# Patient Record
Sex: FEMALE | Race: ASIAN | Employment: UNEMPLOYED | URBAN - METROPOLITAN AREA
[De-identification: names, ages, dates, MRNs, and addresses within clinical notes are randomized per-mention and may not be internally consistent; named-entity substitution may affect disease eponyms.]

---

## 2018-06-11 ENCOUNTER — THERAPY VISIT (OUTPATIENT)
Dept: PHYSICAL THERAPY | Facility: CLINIC | Age: 43
End: 2018-06-11
Payer: COMMERCIAL

## 2018-06-11 DIAGNOSIS — M26.609 TMJ (TEMPOROMANDIBULAR JOINT SYNDROME): Primary | ICD-10-CM

## 2018-06-11 PROCEDURE — 97530 THERAPEUTIC ACTIVITIES: CPT | Mod: GP | Performed by: PHYSICAL THERAPIST

## 2018-06-11 PROCEDURE — 97140 MANUAL THERAPY 1/> REGIONS: CPT | Mod: GP | Performed by: PHYSICAL THERAPIST

## 2018-06-11 PROCEDURE — 97161 PT EVAL LOW COMPLEX 20 MIN: CPT | Mod: GP | Performed by: PHYSICAL THERAPIST

## 2018-06-11 PROCEDURE — 97110 THERAPEUTIC EXERCISES: CPT | Mod: GP | Performed by: PHYSICAL THERAPIST

## 2018-06-11 NOTE — PROGRESS NOTES
[facility] Initial Evaluation  Subjective:  Patient is a 42 year old female presenting with rehab tmj hpi.   Gina Breaux is a 42 year old female with a TMJ bilateral condition.  Condition occurred with:  Other reason.  Condition occurred: at home.  This is a chronic condition  Patient reports a long history of jaw pain >2 years that began around the time that she was diagnosed with lung cancer. Patient reports worsening of jaw pain in 2018 and report 2 episodes of open lock while yawning at night. Patient does reports experiencing an open lock at least one other time at a dentist appointment..    Patient reports pain:  Cervical left, cervical right, face left, face right, TMJ left and TMJ right.    Pain is described as sharp and aching and is constant and reported as 7/10.  Associated symptoms:  Cheek pain, headache cervical, locking: open or closed, ear ache and tinnitus. Pain is worse during the day.  Symptoms are exacerbated by bushing/flossing, dental appointments, chewing, yawning and opening and relieved by rest.  Since onset symptoms are gradually worsening.  Special testing: none.      General health as reported by patient is good.  Pertinent medical history includes:  Cancer (lung cancer).                    Red flags:  None as reported by the patient.                        Objective:  Standing Alignment:    Cervical/Thoracic:  Forward head  Shoulder/UE:  Rounded shoulders                                                          TMJ Evaluation  ROM:   AROM Cervical: normal          AROM TMJ:  Openin mm     Left Laterotrusion:  6mm    Right Laterotrusion:  7 mm    Protrusion: 3mm    Retrusion:  1mm      Parafunctional Habits:    Bruxism:  None    Chewing/Biting Nails:  None    Clenching:  Occasional    Caffeine:  None    Aerobic Exercise:  Seldom  Sleep Quality:  Fair      Palpation:    Left side tenderness present at:  Upper Trap; Levator; Superficial Masseter; Deep  Masseter; Temporalis; Medial Pterygoid and Lateral Pterygoid  Right side tenderness present at:  Upper Trap; Levator; Superficial Masseter; Deep Masseter; Temporalis; Medial Pterygoid and Lateral Pterygoid    Movement Characteristics:    Click:  On right with end range opening  Crepitus:  On right with opening and closing    Subluxation:  With reduction on right  Early Translation:  On left     TMJ Findings:    Tongue Positioning:  Floor  Mucosal Ridging:  None  Tongue Scalloping:  None      Capsule Palpation:  Left side tenderness present at :  Lateral Capsule  Right side tenderness present at:  Lateral Capsule                General     ROS    Assessment/Plan:    Patient is a 42 year old female with both sides TMJ complaints.    Patient has the following significant findings with corresponding treatment plan.                Diagnosis 1:  TMJD  Pain -  hot/cold therapy, manual therapy, self management, education and home program  Decreased ROM/flexibility - manual therapy, therapeutic exercise, therapeutic activity and home program  Decreased strength - therapeutic exercise, therapeutic activities and home program  Inflammation - cold therapy and self management/home program  Impaired muscle performance - neuro re-education and home program  Decreased function - therapeutic activities and home program  Impaired posture - neuro re-education, therapeutic activities and home program    Therapy Evaluation Codes:   1) History comprised of:   Personal factors that impact the plan of care:      Language, Overall behavior pattern, Past/current experiences and Time since onset of symptoms.    Comorbidity factors that impact the plan of care are:      Cancer.     Medications impacting care: None.  2) Examination of Body Systems comprised of:   Body structures and functions that impact the plan of care:      TMJ.   Activity limitations that impact the plan of care are:      chewing, yawning.  3) Clinical presentation  characteristics are:   Stable/Uncomplicated.  4) Decision-Making    Low complexity using standardized patient assessment instrument and/or measureable assessment of functional outcome.  Cumulative Therapy Evaluation is: Low complexity.    Previous and current functional limitations:  (See Goal Flow Sheet for this information)    Short term and Long term goals: (See Goal Flow Sheet for this information)     Communication ability:  Patient appears to be able to clearly communicate and understand verbal and written communication and follow directions correctly.  Treatment Explanation - The following has been discussed with the patient:   RX ordered/plan of care  Anticipated outcomes  Possible risks and side effects  This patient would benefit from PT intervention to resume normal activities.   Rehab potential is good.    Frequency:  1 X week, once daily  Duration:  for 6 weeks  Discharge Plan:  Achieve all LTG.  Independent in home treatment program.  Reach maximal therapeutic benefit.    Please refer to the daily flowsheet for treatment today, total treatment time and time spent performing 1:1 timed codes.

## 2018-06-29 ENCOUNTER — THERAPY VISIT (OUTPATIENT)
Dept: PHYSICAL THERAPY | Facility: CLINIC | Age: 43
End: 2018-06-29
Payer: COMMERCIAL

## 2018-06-29 DIAGNOSIS — M26.609 TMJ (TEMPOROMANDIBULAR JOINT SYNDROME): ICD-10-CM

## 2018-06-29 PROCEDURE — 97110 THERAPEUTIC EXERCISES: CPT | Mod: GP | Performed by: PHYSICAL THERAPIST

## 2018-06-29 PROCEDURE — 97140 MANUAL THERAPY 1/> REGIONS: CPT | Mod: GP | Performed by: PHYSICAL THERAPIST

## 2018-07-17 ENCOUNTER — THERAPY VISIT (OUTPATIENT)
Dept: PHYSICAL THERAPY | Facility: CLINIC | Age: 43
End: 2018-07-17
Payer: COMMERCIAL

## 2018-07-17 DIAGNOSIS — M26.609 TMJ (TEMPOROMANDIBULAR JOINT SYNDROME): ICD-10-CM

## 2018-07-17 PROCEDURE — 97110 THERAPEUTIC EXERCISES: CPT | Mod: GP | Performed by: PHYSICAL THERAPIST

## 2018-07-17 PROCEDURE — 97140 MANUAL THERAPY 1/> REGIONS: CPT | Mod: GP | Performed by: PHYSICAL THERAPIST

## 2018-08-08 ENCOUNTER — THERAPY VISIT (OUTPATIENT)
Dept: PHYSICAL THERAPY | Facility: CLINIC | Age: 43
End: 2018-08-08
Payer: COMMERCIAL

## 2018-08-08 DIAGNOSIS — M26.609 TMJ (TEMPOROMANDIBULAR JOINT SYNDROME): ICD-10-CM

## 2018-08-08 PROCEDURE — 97140 MANUAL THERAPY 1/> REGIONS: CPT | Mod: GP | Performed by: PHYSICAL THERAPIST

## 2018-08-29 ENCOUNTER — THERAPY VISIT (OUTPATIENT)
Dept: PHYSICAL THERAPY | Facility: CLINIC | Age: 43
End: 2018-08-29
Payer: COMMERCIAL

## 2018-08-29 DIAGNOSIS — M26.609 TMJ (TEMPOROMANDIBULAR JOINT SYNDROME): ICD-10-CM

## 2018-08-29 PROCEDURE — 97140 MANUAL THERAPY 1/> REGIONS: CPT | Mod: GP | Performed by: PHYSICAL THERAPIST

## 2018-08-29 NOTE — PROGRESS NOTES
Subjective:  HPI                    Objective:  System    Physical Exam    General     ROS    Assessment/Plan:    DISCHARGE REPORT    Progress reporting period is from 6/11/2018 to 8/29/2018.       SUBJECTIVE  Subjective changes noted by patient:   Subjective: Patient reports not having aHA for 3 weeks. patient reports feeling much better. Patient does report a hearing problem that started recently. patient states when someone talks to her she hears feedback (if someone says hello, she hears Hello....Hello) Patient was told to follow up with her physician regarding this complaint.  Current pain level is  Current Pain level: 4/10 (due to TPN).    Previous pain level was   Initial Pain level: 8/10.   Changes in function:  Yes (See Goal flowsheet attached for changes in current functional level)  Adverse reaction to treatment or activity: None    OBJECTIVE  Changes noted in objective findings:  Yes,   Objective: TMJ AROM WNL throughout.  No tenderness with palpation. subluxation with reduction on right, but mess less prominent and not painful to the patient     ASSESSMENT/PLAN  Updated problem list and treatment plan: Diagnosis 1:  TMJD    STG/LTGs have been met or progress has been made towards goals:  Yes (See Goal flow sheet completed today.)  Assessment of Progress: The patient's condition is improving.  The patient has met all of their long term goals.  Self Management Plans:  Patient is independent in a home treatment program.  Patient is independent in self management of symptoms.  I have re-evaluated this patient and find that the nature, scope, duration and intensity of the therapy is appropriate for the medical condition of the patient.  Gina continues to require the following intervention to meet STG and LTG's:  PT intervention is no longer required to meet STG/LTG.    Recommendations:  This patient is ready to be discharged from therapy and continue their home treatment program.    Please refer to the  daily flowsheet for treatment today, total treatment time and time spent performing 1:1 timed codes.

## 2021-08-27 ENCOUNTER — THERAPY VISIT (OUTPATIENT)
Dept: PHYSICAL THERAPY | Facility: CLINIC | Age: 46
End: 2021-08-27
Payer: MEDICARE

## 2021-08-27 DIAGNOSIS — M26.609 TMJ (TEMPOROMANDIBULAR JOINT SYNDROME): ICD-10-CM

## 2021-08-27 DIAGNOSIS — G44.209 TENSION HEADACHE: ICD-10-CM

## 2021-08-27 PROCEDURE — 97110 THERAPEUTIC EXERCISES: CPT | Mod: GP | Performed by: PHYSICAL THERAPIST

## 2021-08-27 PROCEDURE — 97161 PT EVAL LOW COMPLEX 20 MIN: CPT | Mod: GP | Performed by: PHYSICAL THERAPIST

## 2021-08-27 NOTE — PROGRESS NOTES
Physical Therapy Initial Evaluation  Subjective:  The history is provided by the patient. No  was used.   Therapist Generated HPI Evaluation  Problem details: Pt reports chronic pain in her jaw with headaches. Has had PT in the past for similar symptoms which was helpful. Most recently around five months ago (March 2021) noticed increased in jaw tightness and headaches. Currently undergoing chemotherapy treatments every three weeks for chronic lung cancer (2017 dx). Saw MD who recommended PT. .         Type of problem:  TMJ bilateral (L>R).    This is a recurrent condition.  Condition occurred with:  Other reason.  Where condition occurred: for unknown reasons.  Patient reports pain:  TMJ left and TMJ right.  Pain is described as other (goosepumps and cold) and is constant.  Pain radiates to:  Head. Pain is the same all the time.  Since onset symptoms are unchanged.  Associated symptoms:  Cheek pain, joint noise, headache temporal, difficulty swallowing, stiffness/limited opening and tired or painful jaw muscles. Symptoms are exacerbated by chewing and opening      Previous treatment includes physical therapy.     Parafunctional Habits:    Bruxism:  Maybe a little at night  Mandibular Habits:  No  Chewing/Biting Nails:  No    Clenching:  Yes    Caffeine:  No    Aerobic Exercise:  Walking  Sleep Quality:  N/a  Patient Health History         Pain is reported as 5/10 on pain scale.  General health as reported by patient is fair.  Pertinent medical history includes: cancer, menopausal, migraines/headaches, numbness/tingling, seizures and sleep disorder/apnea (lung cancer, constipation).   Red flags:  Persistent fever/chills and significant weakness.  Medical allergies: latex and adhesives.   Surgeries include:  Cancer surgery (cancer surgery 1/7/17, bunion 2008, endometriosis 2020, obstruct. 2017).    Current medications:  Anti-seizure medication, bone density, pain medication and sleep medication.     Current occupation is none.                                       Objective:  Standing Alignment:    Cervical/Thoracic:  Forward head and cervical lordosis decreased  Shoulder/UE:  Rounded shoulders                  Flexibility/Screens:         Spine:  Decreased left spine flexibility:  Upper Trap    Decreased right spine flexibility:  Upper Trap                                          TMJ Evaluation  ROM:       AROM TMJ:  Openin mm     Left Laterotrusion:  9 mm    Right Laterotrusion:  9 mm          Associated Findings: Headaches:  Cervical and temporal      Palpation:    Left side tenderness present at:  Upper Trap; Levator; Superficial Masseter and Deep Masseter  Right side tenderness present at:  Upper Trap; Levator; Superficial Masseter and Deep Masseter    Movement Characteristics:    Click:  None in clinid, pt reports on L    Deviations:  None      TMJ Findings:    Tongue Positioning:  Needs cueing for rest position                          Keyana Cervical Evaluation    Posture:  Sitting: poor            Movement Loss:    Flexion (Flex): nil  Retraction (RET): min  Extension (EXT): min  Lateral Flexion Right (LF R): min  Lateral Flexion Left (LF L): min  Rotation Right (ROT R): nil  Rotation Left (ROT L): nil                                                 ROS    Assessment/Plan:    Patient is a 45 year old female with both sides TMJ complaints.    Patient has the following significant findings with corresponding treatment plan.                Diagnosis 1:  B TMD, headaches  Pain -  manual therapy, self management, education, directional preference exercise and home program  Decreased ROM/flexibility - manual therapy, therapeutic exercise, therapeutic activity and home program  Decreased joint mobility - manual therapy, therapeutic exercise, therapeutic activity and home program  Inflammation - self management/home program  Decreased function - therapeutic activities and home program  Impaired  posture - neuro re-education, therapeutic activities and home program    Therapy Evaluation Codes:   1) History comprised of:   Personal factors that impact the plan of care:      Past/current experiences and Time since onset of symptoms.    Comorbidity factors that impact the plan of care are:      Cancer.     Medications impacting care: Pain.  2) Examination of Body Systems comprised of:   Body structures and functions that impact the plan of care:      Cervical spine and TMJ.   Activity limitations that impact the plan of care are:      eating.  3) Clinical presentation characteristics are:   Stable/Uncomplicated.  4) Decision-Making    Moderate complexity using standardized patient assessment instrument and/or measureable assessment of functional outcome.  Cumulative Therapy Evaluation is: Low complexity.    Previous and current functional limitations:  (See Goal Flow Sheet for this information)    Short term and Long term goals: (See Goal Flow Sheet for this information)     Communication ability:  Patient appears to be able to clearly communicate and understand verbal and written communication and follow directions correctly.  Treatment Explanation - The following has been discussed with the patient:   RX ordered/plan of care  Anticipated outcomes  Possible risks and side effects  This patient would benefit from PT intervention to resume normal activities.   Rehab potential is excellent.    Frequency:  1 X week, once daily  Duration:  for 8 weeks  Discharge Plan:  Achieve all LTG.  Independent in home treatment program.  Reach maximal therapeutic benefit.    Please refer to the daily flowsheet for treatment today, total treatment time and time spent performing 1:1 timed codes.

## 2021-08-27 NOTE — LETTER
DEPARTMENT OF HEALTH AND HUMAN SERVICES  [facility]    PLAN/UPDATED PLAN OF PROGRESS FOR OUTPATIENT REHABILITATION          PATIENTS NAME:  Gina Breaux     : 1975    PROVIDER NUMBER:    [id number]    [id number]     [facility] NAME: [facility]    MEDICAL RECORD NUMBER: [id number]     START OF CARE DATE:  SOC Date: 21   TYPE:  PT    PRIMARY/TREATMENT DIAGNOSIS: (Pertinent Medical Diagnosis)     TMJ (temporomandibular joint syndrome)  Tension headache    VISITS FROM START OF CARE:  Rxs Used: 1     Physical Therapy Initial Evaluation  Subjective:  The history is provided by the patient. No  was used.   Therapist Generated HPI Evaluation  Problem details: Pt reports chronic pain in her jaw with headaches. Has had PT in the past for similar symptoms which was helpful. Most recently around five months ago (2021) noticed increased in jaw tightness and headaches. Currently undergoing chemotherapy treatments every three weeks for chronic lung cancer (2017 dx). Saw MD who recommended PT. .         Type of problem:  TMJ bilateral (L>R).    This is a recurrent condition.  Condition occurred with:  Other reason.  Where condition occurred: for unknown reasons.  Patient reports pain:  TMJ left and TMJ right.  Pain is described as other (goosepumps and cold) and is constant.  Pain radiates to:  Head. Pain is the same all the time.  Since onset symptoms are unchanged.  Associated symptoms:  Cheek pain, joint noise, headache temporal, difficulty swallowing, stiffness/limited opening and tired or painful jaw muscles. Symptoms are exacerbated by chewing and opening      Previous treatment includes physical therapy.     Parafunctional Habits:    Bruxism:  Maybe a little at night  Mandibular Habits:  No  Chewing/Biting Nails:  No    Clenching:  Yes    Caffeine:  No    Aerobic Exercise:  Walking  Sleep Quality:   N/a  Patient Health History  Pain is reported as 5/10 on pain scale.  General health as reported by patient is fair.  Pertinent medical history includes: cancer, menopausal, migraines/headaches, numbness/tingling, seizures and sleep disorder/apnea (lung cancer, constipation).   Red flags:  Persistent fever/chills and significant weakness.  Medical allergies: latex and adhesives.   Surgeries include:  Cancer surgery (cancer surgery 17, bunion , endometriosis , obstruct. ).    Current medications:  Anti-seizure medication, bone density, pain medication and sleep medication.    Current occupation is none.     Objective:  Standing Alignment:    Cervical/Thoracic:  Forward head and cervical lordosis decreased  Shoulder/UE:  Rounded shoulders  Flexibility/Screens:   Spine:  Decreased left spine flexibility:  Upper Trap  Decreased right spine flexibility:  Upper Trap  TMJ Evaluation  ROM:   AROM TMJ:  Openin mm     Left Laterotrusion:  9 mm    Right Laterotrusion:  9 mm    Associated Findings: Headaches:  Cervical and temporal    Palpation:    Left side tenderness present at:  Upper Trap; Levator; Superficial Masseter and Deep Masseter  Right side tenderness present at:  Upper Trap; Levator; Superficial Masseter and Deep Masseter  Movement Characteristics:    Click:  None in clinid, pt reports on L  Deviations:  None  TMJ Findings:    Tongue Positioning:  Needs cueing for rest position  Keyana Cervical Evaluation  Posture:  Sitting: poor  Movement Loss:  Flexion (Flex): nil  Retraction (RET): min  Extension (EXT): min  Lateral Flexion Right (LF R): min  Lateral Flexion Left (LF L): min  Rotation Right (ROT R): nil  Rotation Left (ROT L): nil    Assessment/Plan:    Patient is a 45 year old female with both sides TMJ complaints.    Patient has the following significant findings with corresponding treatment plan.                Diagnosis 1:  B TMD, headaches  Pain -  manual therapy, self management,  education, directional preference exercise and home program  Decreased ROM/flexibility - manual therapy, therapeutic exercise, therapeutic activity and home program  Decreased joint mobility - manual therapy, therapeutic exercise, therapeutic activity and home program  Inflammation - self management/home program  Decreased function - therapeutic activities and home program  Impaired posture - neuro re-education, therapeutic activities and home program    Therapy Evaluation Codes:   1) History comprised of:   Personal factors that impact the plan of care:      Past/current experiences and Time since onset of symptoms.    Comorbidity factors that impact the plan of care are:      Cancer.     Medications impacting care: Pain.  2) Examination of Body Systems comprised of:   Body structures and functions that impact the plan of care:      Cervical spine and TMJ.   Activity limitations that impact the plan of care are:      eating.  3) Clinical presentation characteristics are:   Stable/Uncomplicated.  4) Decision-Making    Moderate complexity using standardized patient assessment instrument and/or measureable assessment of functional outcome.  Cumulative Therapy Evaluation is: Low complexity.    Previous and current functional limitations:  (See Goal Flow Sheet for this information)    Short term and Long term goals: (See Goal Flow Sheet for this information)     Communication ability:  Patient appears to be able to clearly communicate and understand verbal and written communication and follow directions correctly.  Treatment Explanation - The following has been discussed with the patient:   RX ordered/plan of care  Anticipated outcomes  Possible risks and side effects  This patient would benefit from PT intervention to resume normal activities.   Rehab potential is excellent.    Frequency:  1 X week, once daily  Duration:  for 8 weeks  Discharge Plan:  Achieve all LTG.  Independent in home treatment program.  Reach  "maximal therapeutic benefit.    Caregiver Signature/Credentials _____________________________ Date ________       Treating Provider: Anthony Berry DPT   I have reviewed and certified the need for these services and plan of treatment while under my care.        PHYSICIAN'S SIGNATURE:   _________________________________________  Date___________   Ramirez Crabtree MD    Certification period:  Beginning of Cert date period: 08/27/21 to  End of Cert period date: 11/24/21     Functional Level Progress Report: Please see attached \"Goal Flow sheet for Functional level.\"    ____X____ Continue Services or       ________ DC Services                Service dates: From  SOC Date: 08/27/21 date to present                         "

## 2021-09-03 ENCOUNTER — THERAPY VISIT (OUTPATIENT)
Dept: PHYSICAL THERAPY | Facility: CLINIC | Age: 46
End: 2021-09-03
Payer: MEDICARE

## 2021-09-03 DIAGNOSIS — G44.209 TENSION HEADACHE: ICD-10-CM

## 2021-09-03 DIAGNOSIS — M26.609 TMJ (TEMPOROMANDIBULAR JOINT SYNDROME): ICD-10-CM

## 2021-09-03 PROCEDURE — 97110 THERAPEUTIC EXERCISES: CPT | Mod: GP | Performed by: PHYSICAL THERAPIST

## 2021-09-03 PROCEDURE — 97140 MANUAL THERAPY 1/> REGIONS: CPT | Mod: GP | Performed by: PHYSICAL THERAPIST

## 2021-09-03 NOTE — PROGRESS NOTES
Subjective:  HPI  Physical Exam                    Objective:  System    Physical Exam    General     ROS    Assessment/Plan:    SUBJECTIVE  Subjective changes as noted by pt:  The pain is lessened, performing the HEP as prescribed. Less tired feeling when yawning.        Current pain level: 4/10     Changes in function:  Yes (See Goal flowsheet attached for changes in current functional level)     Adverse reaction to treatment or activity:  None    OBJECTIVE  Changes in objective findings:  Yes, cues needed for rest position of tongue during controlled rotation.         ASSESSMENT  Gina continues to require intervention to meet STG and LTG's: PT  Patient is progressing as expected.  Response to therapy has shown an improvement in  pain level  Progress made towards STG/LTG?  Yes (See Goal flowsheet attached for updates on achievement of STG and LTG)    PLAN  Continue current treatment plan until patient demonstrates readiness to progress to higher level exercises.    PTA/ATC plan:  N/A    Please refer to the daily flowsheet for treatment today, total treatment time and time spent performing 1:1 timed codes.

## 2021-09-09 ENCOUNTER — THERAPY VISIT (OUTPATIENT)
Dept: PHYSICAL THERAPY | Facility: CLINIC | Age: 46
End: 2021-09-09
Payer: MEDICARE

## 2021-09-09 DIAGNOSIS — G44.209 TENSION HEADACHE: ICD-10-CM

## 2021-09-09 DIAGNOSIS — M26.609 TMJ (TEMPOROMANDIBULAR JOINT SYNDROME): ICD-10-CM

## 2021-09-09 PROCEDURE — 97140 MANUAL THERAPY 1/> REGIONS: CPT | Mod: GP | Performed by: PHYSICAL THERAPIST

## 2021-09-09 PROCEDURE — 97110 THERAPEUTIC EXERCISES: CPT | Mod: GP | Performed by: PHYSICAL THERAPIST

## 2021-09-09 NOTE — PROGRESS NOTES
Subjective:  HPI  Physical Exam                    Objective:  System    Physical Exam    General     ROS    Assessment/Plan:    SUBJECTIVE  Subjective changes as noted by pt:  Gina reports that she's getting better. The jaw has less pain. Performing the HEP as prescribed.        Current pain level: 4/10     Changes in function:  Yes (See Goal flowsheet attached for changes in current functional level)     Adverse reaction to treatment or activity:  None    OBJECTIVE  Changes in objective findings:  Yes, much improved technique on controlled rotation.         ASSESSMENT  Gina continues to require intervention to meet STG and LTG's: PT  Patient is progressing as expected.  Response to therapy has shown an improvement in  pain level  Progress made towards STG/LTG?  Yes (See Goal flowsheet attached for updates on achievement of STG and LTG)    PLAN  Continue current treatment plan until patient demonstrates readiness to progress to higher level exercises.    PTA/ATC plan:  N/A    Please refer to the daily flowsheet for treatment today, total treatment time and time spent performing 1:1 timed codes.

## 2021-09-16 ENCOUNTER — THERAPY VISIT (OUTPATIENT)
Dept: PHYSICAL THERAPY | Facility: CLINIC | Age: 46
End: 2021-09-16
Payer: MEDICARE

## 2021-09-16 DIAGNOSIS — G44.209 TENSION HEADACHE: ICD-10-CM

## 2021-09-16 DIAGNOSIS — M26.609 TMJ (TEMPOROMANDIBULAR JOINT SYNDROME): ICD-10-CM

## 2021-09-16 PROCEDURE — 97140 MANUAL THERAPY 1/> REGIONS: CPT | Mod: GP | Performed by: PHYSICAL THERAPIST

## 2021-09-16 PROCEDURE — 97110 THERAPEUTIC EXERCISES: CPT | Mod: GP | Performed by: PHYSICAL THERAPIST

## 2021-09-16 NOTE — PROGRESS NOTES
Subjective:  HPI  Physical Exam                    Objective:  System    Physical Exam    General     ROS    Assessment/Plan:    SUBJECTIVE  Subjective changes as noted by pt:  Doing quite well. Symptoms are continuing to decrease with the HEP.        Current pain level: 5/10 (feels worse today due to PMS)   Changes in function:  Yes (See Goal flowsheet attached for changes in current functional level)     Adverse reaction to treatment or activity:  None    OBJECTIVE  Changes in objective findings:  Yes, excellent technique on controlled rotation with tongue cluck.           ASSESSMENT  Gina continues to require intervention to meet STG and LTG's: PT  Patient's symptoms are resolving.  Response to therapy has shown an improvement in  pain level and function  Progress made towards STG/LTG?  Yes (See Goal flowsheet attached for updates on achievement of STG and LTG)    PLAN  Continue current treatment plan until patient demonstrates readiness to progress to higher level exercises.    PTA/ATC plan:  N/A    Please refer to the daily flowsheet for treatment today, total treatment time and time spent performing 1:1 timed codes.

## 2021-09-23 ENCOUNTER — THERAPY VISIT (OUTPATIENT)
Dept: PHYSICAL THERAPY | Facility: CLINIC | Age: 46
End: 2021-09-23
Payer: MEDICARE

## 2021-09-23 DIAGNOSIS — M26.609 TMJ (TEMPOROMANDIBULAR JOINT SYNDROME): ICD-10-CM

## 2021-09-23 DIAGNOSIS — G44.209 TENSION HEADACHE: ICD-10-CM

## 2021-09-23 PROCEDURE — 97140 MANUAL THERAPY 1/> REGIONS: CPT | Mod: GP | Performed by: PHYSICAL THERAPIST

## 2021-09-23 PROCEDURE — 97110 THERAPEUTIC EXERCISES: CPT | Mod: GP | Performed by: PHYSICAL THERAPIST

## 2021-09-23 PROCEDURE — 97112 NEUROMUSCULAR REEDUCATION: CPT | Mod: GP | Performed by: PHYSICAL THERAPIST

## 2021-09-23 NOTE — PROGRESS NOTES
Subjective:  HPI  Physical Exam                    Objective:        Flexibility/Screens:         Spine:  Decreased left spine flexibility:  Upper Trap    Decreased right spine flexibility:  Upper Trap                                          TMJ Evaluation  ROM:       AROM TMJ:  Openin mm                     Palpation:    Left side tenderness present at:  Superficial Masseter  Right side tenderness present at:  Superficial Masseter                      [place] Cervical Evaluation      Movement Loss:    Flexion (Flex): nil  Retraction (RET): nil  Extension (EXT): min  Lateral Flexion Right (LF R): nil  Lateral Flexion Left (LF L): nil  Rotation Right (ROT R): nil  Rotation Left (ROT L): nil                                                 ROS    Assessment/Plan:    DISCHARGE REPORT    Progress reporting period is from 21 to 21.       SUBJECTIVE  Subjective changes noted by patient:  Gina reports that she continues to improve. Overall 95% improved. Denies much pain from headaches or TMJ. Does note some palpable soreness B masseters. Tightness in shoulders. Continues HEP and feels ready to self manage at this point.          Current pain level is 4/10 notes that it's more sore today due to weather .     Previous pain level was  5/10    Changes in function:  Yes (See Goal flowsheet attached for changes in current functional level)  Adverse reaction to treatment or activity: None    OBJECTIVE  Changes noted in objective findings:  Yes, see physical exam secton        ASSESSMENT/PLAN  Updated problem list and treatment plan: Diagnosis 1:  B TMD, headaches    Pain -  manual therapy, self management, education, directional preference exercise and home program  Decreased ROM/flexibility - manual therapy, therapeutic exercise, therapeutic activity and home program  Inflammation - self management/home program  Decreased function - therapeutic activities and home program  Impaired posture - neuro  re-education, therapeutic activities and home program  STG/LTGs have been met or progress has been made towards goals:  Yes (See Goal flow sheet completed today.)  Assessment of Progress: The patient has met all of their long term goals.  Self Management Plans:  Patient is independent in a home treatment program.  Patient is independent in self management of symptoms.  I have re-evaluated this patient and find that the nature, scope, duration and intensity of the therapy is appropriate for the medical condition of the patient.  Gina continues to require the following intervention to meet STG and LTG's:  PT intervention is no longer required to meet STG/LTG.    Recommendations:  This patient is ready to be discharged from therapy and continue their home treatment program.    Please refer to the daily flowsheet for treatment today, total treatment time and time spent performing 1:1 timed codes.